# Patient Record
Sex: FEMALE | URBAN - METROPOLITAN AREA
[De-identification: names, ages, dates, MRNs, and addresses within clinical notes are randomized per-mention and may not be internally consistent; named-entity substitution may affect disease eponyms.]

---

## 2022-05-18 ENCOUNTER — CLAIMS CREATED FROM THE CLAIM WINDOW (OUTPATIENT)
Dept: URBAN - METROPOLITAN AREA MEDICAL CENTER 2 | Facility: MEDICAL CENTER | Age: 72
End: 2022-05-18
Payer: MEDICARE

## 2022-05-18 DIAGNOSIS — Z98.84 H/O GASTRIC BYPASS: ICD-10-CM

## 2022-05-18 DIAGNOSIS — D50.9 IRON DEFICIENCY ANEMIA: ICD-10-CM

## 2022-05-18 DIAGNOSIS — K25.5 CHRONIC OR UNSPECIFIED GASTRIC ULCER WITH PERFORATION: ICD-10-CM

## 2022-05-18 DIAGNOSIS — R10.13 ABDOMINAL DISCOMFORT, EPIGASTRIC: ICD-10-CM

## 2022-05-18 DIAGNOSIS — G20 PARKINSON DISEASE: ICD-10-CM

## 2022-05-18 PROCEDURE — 99223 1ST HOSP IP/OBS HIGH 75: CPT | Performed by: INTERNAL MEDICINE

## 2022-05-19 ENCOUNTER — CLAIMS CREATED FROM THE CLAIM WINDOW (OUTPATIENT)
Dept: URBAN - METROPOLITAN AREA MEDICAL CENTER 2 | Facility: MEDICAL CENTER | Age: 72
End: 2022-05-19
Payer: MEDICARE

## 2022-05-19 DIAGNOSIS — D50.9 ANEMIA: ICD-10-CM

## 2022-05-19 DIAGNOSIS — R19.5 LOOSE STOOLS: ICD-10-CM

## 2022-05-19 DIAGNOSIS — K25.5 CHRONIC OR UNSPECIFIED GASTRIC ULCER WITH PERFORATION: ICD-10-CM

## 2022-05-19 DIAGNOSIS — Z98.84 H/O GASTRIC BYPASS: ICD-10-CM

## 2022-05-19 PROCEDURE — 99232 SBSQ HOSP IP/OBS MODERATE 35: CPT | Performed by: INTERNAL MEDICINE

## 2022-05-30 ENCOUNTER — TELEPHONE ENCOUNTER (OUTPATIENT)
Dept: URBAN - METROPOLITAN AREA CLINIC 113 | Facility: CLINIC | Age: 72
End: 2022-05-30

## 2022-07-05 ENCOUNTER — WEB ENCOUNTER (OUTPATIENT)
Dept: URBAN - METROPOLITAN AREA CLINIC 113 | Facility: CLINIC | Age: 72
End: 2022-07-05

## 2022-07-06 ENCOUNTER — DASHBOARD ENCOUNTERS (OUTPATIENT)
Age: 72
End: 2022-07-06

## 2022-07-06 ENCOUNTER — WEB ENCOUNTER (OUTPATIENT)
Dept: URBAN - METROPOLITAN AREA CLINIC 113 | Facility: CLINIC | Age: 72
End: 2022-07-06

## 2022-07-06 ENCOUNTER — OFFICE VISIT (OUTPATIENT)
Dept: URBAN - METROPOLITAN AREA CLINIC 113 | Facility: CLINIC | Age: 72
End: 2022-07-06
Payer: MEDICARE

## 2022-07-06 VITALS
TEMPERATURE: 98 F | BODY MASS INDEX: 40.77 KG/M2 | WEIGHT: 269 LBS | DIASTOLIC BLOOD PRESSURE: 80 MMHG | HEIGHT: 68 IN | HEART RATE: 73 BPM | SYSTOLIC BLOOD PRESSURE: 121 MMHG

## 2022-07-06 DIAGNOSIS — K25.5 GASTRIC ULCER WITH PERFORATION, UNSPECIFIED CHRONICITY: ICD-10-CM

## 2022-07-06 DIAGNOSIS — Z98.84 HISTORY OF ROUX-EN-Y GASTRIC BYPASS: ICD-10-CM

## 2022-07-06 PROBLEM — 9829001: Status: ACTIVE | Noted: 2022-07-06

## 2022-07-06 PROCEDURE — 99213 OFFICE O/P EST LOW 20 MIN: CPT | Performed by: NURSE PRACTITIONER

## 2022-07-06 RX ORDER — PANTOPRAZOLE SODIUM 40 MG/1
1 TABLET TABLET, DELAYED RELEASE ORAL TWICE A DAY
Status: ACTIVE | COMMUNITY

## 2022-07-06 RX ORDER — LEVOCETIRIZINE DIHYDROCHLORIDE 5 MG/1
1 TABLET IN THE EVENING TABLET ORAL ONCE A DAY
Status: ACTIVE | COMMUNITY

## 2022-07-06 RX ORDER — ASPIRIN/SOD BICARB/CITRIC ACID 325-1916MG
2 TABLETS DISSOLVED IN 4 OUNCES OF WATER AS NEEDED TABLET, EFFERVESCENT ORAL TWICE A DAY
Status: ACTIVE | COMMUNITY

## 2022-07-06 RX ORDER — METOPROLOL TARTRATE 25 MG/1
1 TABLET WITH FOOD TABLET, FILM COATED ORAL TWICE A DAY
Status: ACTIVE | COMMUNITY

## 2022-07-06 RX ORDER — FLUOXETINE 40 MG/1
1 CAPSULE CAPSULE ORAL ONCE A DAY
Status: ACTIVE | COMMUNITY

## 2022-07-06 RX ORDER — MIRTAZAPINE 15 MG/1
1 TABLET AT BEDTIME TABLET, FILM COATED ORAL ONCE A DAY
Status: ACTIVE | COMMUNITY

## 2022-07-06 RX ORDER — CARBIDOPA AND LEVODOPA 25; 100 MG/1; MG/1
1 TABLET AS NEEDED TABLET ORAL
Status: ACTIVE | COMMUNITY

## 2022-07-06 RX ORDER — GABAPENTIN 300 MG/1
1 CAPSULE CAPSULE ORAL
Status: ACTIVE | COMMUNITY

## 2022-07-06 RX ORDER — ONDANSETRON 4 MG/1
1 TABLET ON THE TONGUE AND ALLOW TO DISSOLVE TABLET, ORALLY DISINTEGRATING ORAL ONCE A DAY
Status: ACTIVE | COMMUNITY

## 2022-07-06 RX ORDER — VENLAFAXINE HYDROCHLORIDE 75 MG/1
2/3 CAPSULE WITH FOOD CAPSULE, EXTENDED RELEASE ORAL ONCE A DAY
Status: ACTIVE | COMMUNITY

## 2022-07-06 NOTE — HPI-TODAY'S VISIT:
This is a 72-year-old woman with a history of peptic ulcer disease, status post gastric bypass surgery, Parkinson's disease, who presented to the ED at [facility] on 5/18/2022 for complaints of worsening upper abdominal pain.  She reported occasional NSAID use.  CT of the abdomen and pelvis without contrast was notable for a perforated ulcer in the superior aspect of the gastric pylorus with a small contained gas-filled collection between the stomach and liver.  Prior gastric bypass noted.  There was no evidence of associated GI bleeding at the time.  She was being followed by surgery who recommended conservative management.  Her abdominal exam was benign.  She was continued on pantoprazole.  Regarding noted anemia on admission, her hemoglobin was 9.5 which slowly trended down to 8.3.  There was no reported overt GI blood loss.  MCV was normal at 96.2.  Ferritin was low at 5.1 and iron saturation 5.9%.  She was seen in consultation by Dr. Blackman.  Per the CT's description, the ulcer location was likely in the excluded stomach and not accessible by endoscopy (status post Amee-en-Y gastric bypass).  She was doing well with supportive care.  She was recommended conservative management per surgery.  A repeat CT was recommended in several weeks to further evaluate this area, as well as to rule out malignancy. She tells me that she had Amee-en-Y gastric bypass in 1992 at [facility].  At the time, the procedure was still considered quasi experimental, and she had "large amounts of small intestines removed at the time of Amee-en-Y."  This is resulted and malabsorption every time.  She reports in 2016 for hospitalizations due to sepsis.  The initial cause of her sepsis was an appendiceal rupture.  She required 2 separate bowel resections during her hospitalizations. She tells me that she had recurrence of upper abdominal pain very similar to the pain she experienced in May 2022 prompting her to return to the ED.  Her pantoprazole was refilled for her and she was provided pain medication, specifically oxycodone, and ondansetron for nausea.  She believes she had a CT of the abdomen and pelvis with contrast completed during the ED visit as well as labs.  Upper abdominal pain is medicated with oxycodone as needed.  She was given 12 tablets in the ED, and has only used 4 of them.  Records from this ED visit will need to be obtained for review.